# Patient Record
Sex: FEMALE | Race: WHITE | HISPANIC OR LATINO | URBAN - METROPOLITAN AREA
[De-identification: names, ages, dates, MRNs, and addresses within clinical notes are randomized per-mention and may not be internally consistent; named-entity substitution may affect disease eponyms.]

---

## 2022-09-01 ENCOUNTER — OFFICE VISIT (OUTPATIENT)
Dept: URBAN - METROPOLITAN AREA CLINIC 7 | Facility: CLINIC | Age: 32
End: 2022-09-01
Payer: COMMERCIAL

## 2022-09-01 VITALS
HEIGHT: 62 IN | SYSTOLIC BLOOD PRESSURE: 112 MMHG | WEIGHT: 133 LBS | DIASTOLIC BLOOD PRESSURE: 75 MMHG | BODY MASS INDEX: 24.48 KG/M2 | TEMPERATURE: 98 F

## 2022-09-01 DIAGNOSIS — R10.13 EPIGASTRIC PAIN: ICD-10-CM

## 2022-09-01 DIAGNOSIS — K21.9 GASTROESOPHAGEAL REFLUX DISEASE, UNSPECIFIED WHETHER ESOPHAGITIS PRESENT: ICD-10-CM

## 2022-09-01 DIAGNOSIS — K59.00 CONSTIPATION, UNSPECIFIED CONSTIPATION TYPE: ICD-10-CM

## 2022-09-01 PROBLEM — 79922009: Status: ACTIVE | Noted: 2022-09-01

## 2022-09-01 PROBLEM — 14760008: Status: ACTIVE | Noted: 2022-09-01

## 2022-09-01 PROCEDURE — 99244 OFF/OP CNSLTJ NEW/EST MOD 40: CPT | Performed by: STUDENT IN AN ORGANIZED HEALTH CARE EDUCATION/TRAINING PROGRAM

## 2022-09-01 RX ORDER — FAMOTIDINE 40 MG/1
1 TABLET AT BEDTIME TABLET, FILM COATED ORAL ONCE A DAY
Status: ACTIVE | COMMUNITY

## 2022-09-01 RX ORDER — PANTOPRAZOLE SODIUM 40 MG/1
1 TABLET TABLET, DELAYED RELEASE ORAL TWICE A DAY
Status: ACTIVE | COMMUNITY

## 2022-09-01 RX ORDER — PSYLLIUM SEED (WITH DEXTROSE)
1 PACKET WITH 8 OUNCES OF LIQUID AS NEEDED POWDER (GRAM) ORAL TWICE A DAY
Qty: 60 | Refills: 0 | OUTPATIENT
Start: 2022-09-01 | End: 2022-10-01

## 2022-09-01 RX ORDER — ESCITALOPRAM OXALATE 10 MG/1
1 TABLET TABLET ORAL ONCE A DAY
Status: ACTIVE | COMMUNITY

## 2022-09-01 NOTE — HPI-TODAY'S VISIT:
Patient has a history of GERD on pantoprazole and famotidine who presents for eval of EGD  GI Hx: 8/2021- 4/2022 was pregnant and had daily nausea, vomiting (bilious), epigastric pain constant, but increased at some times. Currently symptoms are constant. Worse with eating spicy foods. Not breastfeeding. Having constipation. BM is pasty, foul smelling. Eating dietary fiber. Drinking 6 water botles. 1 cup of coffee/day.  Denies weight loss, fever, chills, abdominal pain, nausea, vomiting, diarrhea.  Denies dysphagia. Denies hematemesis, melena, hematochezia, blood per rectum.  EGD: None  Colonoscopy: None  Imaging/Studies/Procedures:

## 2022-09-09 PROBLEM — 235595009: Status: ACTIVE | Noted: 2022-08-31

## 2022-09-10 LAB
% SATURATION: 15
A/G RATIO: 1.8
ABSOLUTE BASOPHILS: 19
ABSOLUTE EOSINOPHILS: 139
ABSOLUTE LYMPHOCYTES: 1666
ABSOLUTE MONOCYTES: 355
ABSOLUTE NEUTROPHILS: 2621
ALBUMIN: 4.7
ALKALINE PHOSPHATASE: 74
ALT (SGPT): 14
AST (SGOT): 14
BASOPHILS: 0.4
BILIRUBIN, TOTAL: 0.4
BUN/CREATININE RATIO: (no result)
BUN: 15
CALCIUM: 9.4
CARBON DIOXIDE, TOTAL: 24
CHLORIDE: 104
CREATININE: 0.63
EGFR: 119
EOSINOPHILS: 2.9
FERRITIN: 9
GLOBULIN, TOTAL: 2.6
GLUCOSE: 80
HEMATOCRIT: 38.9
HEMOGLOBIN: 13.1
IMMUNOGLOBULIN A, QN, SERUM: 145
INTERPRETATION: (no result)
IRON BINDING CAPACITY: 387
IRON, TOTAL: 57
LIPASE: 41
LYMPHOCYTES: 34.7
MCH: 30
MCHC: 33.7
MCV: 89
MONOCYTES: 7.4
MPV: 10.7
NEUTROPHILS: 54.6
PLATELET COUNT: 270
POTASSIUM: 4.3
PROTEIN, TOTAL: 7.3
RDW: 13.4
RED BLOOD CELL COUNT: 4.37
SODIUM: 137
T-TRANSGLUTAMINASE (TTG) IGA: <1
TSH W/REFLEX TO FT4: 0.93
WHITE BLOOD CELL COUNT: 4.8

## 2022-09-16 ENCOUNTER — OFFICE VISIT (OUTPATIENT)
Dept: URBAN - METROPOLITAN AREA SURGERY CENTER 5 | Facility: SURGERY CENTER | Age: 32
End: 2022-09-16
Payer: COMMERCIAL

## 2022-09-16 DIAGNOSIS — K29.70 CHRONIC ACITVE GASTRITIS (H.PYLORI NEGATIVE): ICD-10-CM

## 2022-09-16 PROCEDURE — 43239 EGD BIOPSY SINGLE/MULTIPLE: CPT | Performed by: STUDENT IN AN ORGANIZED HEALTH CARE EDUCATION/TRAINING PROGRAM

## 2022-09-16 RX ORDER — PSYLLIUM SEED (WITH DEXTROSE)
1 PACKET WITH 8 OUNCES OF LIQUID AS NEEDED POWDER (GRAM) ORAL TWICE A DAY
Qty: 60 | Refills: 0 | Status: ACTIVE | COMMUNITY
Start: 2022-09-01 | End: 2022-10-01

## 2022-09-16 RX ORDER — PANTOPRAZOLE SODIUM 40 MG/1
1 TABLET TABLET, DELAYED RELEASE ORAL TWICE A DAY
Status: ACTIVE | COMMUNITY

## 2022-09-16 RX ORDER — ESCITALOPRAM OXALATE 10 MG/1
1 TABLET TABLET ORAL ONCE A DAY
Status: ACTIVE | COMMUNITY

## 2022-09-16 RX ORDER — FAMOTIDINE 40 MG/1
1 TABLET AT BEDTIME TABLET, FILM COATED ORAL ONCE A DAY
Status: ACTIVE | COMMUNITY

## 2022-09-16 NOTE — HPI-TODAY'S VISIT:
Patient has a history of GERD on pantoprazole and famotidine who presents for eval of EGD  GI Hx: 8/2021- 4/2022 was pregnant and had daily nausea, vomiting (bilious), epigastric pain constant, but increased at some times. Currently symptoms are constant. Worse with eating spicy foods. Not breastfeeding. Having constipation. BM is pasty, foul smelling. Eating dietary fiber. Drinking 6 water botles. 1 cup of coffee/day.  Denies weight loss, fever, chills, abdominal pain, nausea, vomiting, diarrhea.  Denies dysphagia. Denies hematemesis, melena, hematochezia, blood per rectum.  EGD: None  Colonoscopy: None  Imaging/Studies/Procedures: RUQ U/S 9/12/2022- normal. Labs 8/2022- normal CMP, CBC, TSH, Ferritin low, but no MICHI

## 2022-09-26 ENCOUNTER — TELEPHONE ENCOUNTER (OUTPATIENT)
Dept: URBAN - METROPOLITAN AREA CLINIC 7 | Facility: CLINIC | Age: 32
End: 2022-09-26

## 2022-09-26 PROBLEM — 35240004: Status: ACTIVE | Noted: 2022-09-26

## 2022-09-26 PROBLEM — 87522002: Status: ACTIVE | Noted: 2022-09-26

## 2022-10-18 ENCOUNTER — TELEPHONE ENCOUNTER (OUTPATIENT)
Dept: URBAN - METROPOLITAN AREA CLINIC 7 | Facility: CLINIC | Age: 32
End: 2022-10-18

## 2022-11-15 ENCOUNTER — TELEPHONE ENCOUNTER (OUTPATIENT)
Dept: URBAN - METROPOLITAN AREA CLINIC 9 | Facility: CLINIC | Age: 32
End: 2022-11-15

## 2022-11-15 RX ORDER — PANTOPRAZOLE SODIUM 40 MG/1
1 TABLET TABLET, DELAYED RELEASE ORAL TWICE A DAY
Status: ACTIVE | COMMUNITY

## 2022-11-15 RX ORDER — FAMOTIDINE 40 MG/1
1 TABLET AT BEDTIME TABLET, FILM COATED ORAL ONCE A DAY
Status: ACTIVE | COMMUNITY

## 2022-11-15 RX ORDER — ESCITALOPRAM OXALATE 10 MG/1
1 TABLET TABLET ORAL ONCE A DAY
Status: ACTIVE | COMMUNITY

## 2022-11-15 RX ORDER — OMEPRAZOLE 20 MG/1
1 CAPSULE 30 MINUTES BEFORE A MEAL CAPSULE, DELAYED RELEASE ORAL TWICE A DAY
Qty: 180 | Refills: 3 | OUTPATIENT

## 2023-05-30 ENCOUNTER — TELEPHONE ENCOUNTER (OUTPATIENT)
Dept: URBAN - METROPOLITAN AREA CLINIC 7 | Facility: CLINIC | Age: 33
End: 2023-05-30

## 2023-06-01 ENCOUNTER — TELEPHONE ENCOUNTER (OUTPATIENT)
Dept: URBAN - METROPOLITAN AREA CLINIC 7 | Facility: CLINIC | Age: 33
End: 2023-06-01

## 2023-06-02 ENCOUNTER — WEB ENCOUNTER (OUTPATIENT)
Dept: URBAN - METROPOLITAN AREA CLINIC 7 | Facility: CLINIC | Age: 33
End: 2023-06-02

## 2023-06-02 ENCOUNTER — DASHBOARD ENCOUNTERS (OUTPATIENT)
Age: 33
End: 2023-06-02

## 2023-06-02 ENCOUNTER — OFFICE VISIT (OUTPATIENT)
Dept: URBAN - METROPOLITAN AREA CLINIC 7 | Facility: CLINIC | Age: 33
End: 2023-06-02
Payer: COMMERCIAL

## 2023-06-02 ENCOUNTER — TELEPHONE ENCOUNTER (OUTPATIENT)
Dept: URBAN - METROPOLITAN AREA CLINIC 7 | Facility: CLINIC | Age: 33
End: 2023-06-02

## 2023-06-02 VITALS
HEIGHT: 62 IN | BODY MASS INDEX: 27.23 KG/M2 | SYSTOLIC BLOOD PRESSURE: 120 MMHG | DIASTOLIC BLOOD PRESSURE: 82 MMHG | WEIGHT: 148 LBS | RESPIRATION RATE: 16 BRPM | TEMPERATURE: 97.7 F

## 2023-06-02 DIAGNOSIS — R10.13 EPIGASTRIC PAIN: ICD-10-CM

## 2023-06-02 DIAGNOSIS — K21.9 GASTROESOPHAGEAL REFLUX DISEASE, UNSPECIFIED WHETHER ESOPHAGITIS PRESENT: ICD-10-CM

## 2023-06-02 DIAGNOSIS — R14.0 BLOATING: ICD-10-CM

## 2023-06-02 DIAGNOSIS — R11.0 NAUSEA: ICD-10-CM

## 2023-06-02 DIAGNOSIS — K59.00 CONSTIPATION, UNSPECIFIED CONSTIPATION TYPE: ICD-10-CM

## 2023-06-02 DIAGNOSIS — D50.9 IRON DEFICIENCY ANEMIA, UNSPECIFIED IRON DEFICIENCY ANEMIA TYPE: ICD-10-CM

## 2023-06-02 PROCEDURE — 99214 OFFICE O/P EST MOD 30 MIN: CPT | Performed by: STUDENT IN AN ORGANIZED HEALTH CARE EDUCATION/TRAINING PROGRAM

## 2023-06-02 RX ORDER — RABEPRAZOLE SODIUM 20 MG/1
1 TABLET TABLET, DELAYED RELEASE ORAL TWICE A DAY
Qty: 60 TABLET | Refills: 5 | OUTPATIENT
Start: 2023-06-02

## 2023-06-02 RX ORDER — ESCITALOPRAM OXALATE 10 MG/1
1 TABLET TABLET ORAL ONCE A DAY
Status: ACTIVE | COMMUNITY

## 2023-06-02 RX ORDER — PANTOPRAZOLE SODIUM 40 MG/1
1 TABLET TABLET, DELAYED RELEASE ORAL TWICE A DAY
Status: DISCONTINUED | COMMUNITY

## 2023-06-02 RX ORDER — OMEPRAZOLE 20 MG/1
1 CAPSULE 30 MINUTES BEFORE A MEAL CAPSULE, DELAYED RELEASE ORAL TWICE A DAY
OUTPATIENT

## 2023-06-02 RX ORDER — FAMOTIDINE 40 MG/1
1 TABLET AT BEDTIME TABLET, FILM COATED ORAL ONCE A DAY
Status: ACTIVE | COMMUNITY

## 2023-06-02 RX ORDER — FAMOTIDINE 40 MG/1
1 TABLET AT BEDTIME TABLET, FILM COATED ORAL ONCE A DAY
Qty: 30 | Refills: 3 | OUTPATIENT

## 2023-06-02 RX ORDER — OMEPRAZOLE 20 MG/1
1 CAPSULE 30 MINUTES BEFORE A MEAL CAPSULE, DELAYED RELEASE ORAL TWICE A DAY
Qty: 180 | Refills: 3 | Status: ACTIVE | COMMUNITY

## 2023-06-02 NOTE — HPI-TODAY'S VISIT:
Patient has a history of GERD, who presents for follow up  GI Hx: 8/2021- 4/2022 was pregnant and had daily nausea, vomiting (bilious), epigastric pain constant, but increased at some times. Currently symptoms are constant. Worse with eating spicy foods. Not breastfeeding. Having constipation. BM is pasty, foul smelling. Eating dietary fiber. Drinking 6 water botles. 1 cup of coffee/day. 6/2/23- Still having abd pain, nausea, GERD. Took her mother's dexilant and it helped. Having some nasuea and early satiety.  Denies weight loss, fever, chills, abdominal pain, nausea, vomiting, diarrhea.  Denies dysphagia. Denies hematemesis, melena, hematochezia, blood per rectum.  EGD: 9/16/22- gastritis (bx: mild chronic inflammation, neg HP), normal duodenum (bx: normal).  Colonoscopy: None  Imaging/Studies/Procedures: 4/2022- Hgb 8.1. RUQ U/S 9/12/2022- normal. Labs 8/2022- normal CMP, CBC, TSH, Hgb 13.1, Fe studies: 7/15%, Ferritin 9.

## 2023-06-05 ENCOUNTER — TELEPHONE ENCOUNTER (OUTPATIENT)
Dept: URBAN - METROPOLITAN AREA CLINIC 7 | Facility: CLINIC | Age: 33
End: 2023-06-05

## 2023-06-19 ENCOUNTER — OFFICE VISIT (OUTPATIENT)
Dept: URBAN - METROPOLITAN AREA CLINIC 7 | Facility: CLINIC | Age: 33
End: 2023-06-19

## 2023-06-19 RX ORDER — ESCITALOPRAM OXALATE 10 MG/1
1 TABLET TABLET ORAL ONCE A DAY
Status: ACTIVE | COMMUNITY

## 2023-06-19 RX ORDER — RABEPRAZOLE SODIUM 20 MG/1
1 TABLET TABLET, DELAYED RELEASE ORAL TWICE A DAY
Qty: 60 TABLET | Refills: 5 | Status: ACTIVE | COMMUNITY
Start: 2023-06-02

## 2023-06-19 RX ORDER — FAMOTIDINE 40 MG/1
1 TABLET AT BEDTIME TABLET, FILM COATED ORAL ONCE A DAY
Qty: 30 | Refills: 3 | Status: ACTIVE | COMMUNITY

## 2023-06-19 RX ORDER — FAMOTIDINE 40 MG/1
1 TABLET AT BEDTIME TABLET, FILM COATED ORAL ONCE A DAY
Qty: 30 | Refills: 3 | OUTPATIENT

## 2023-06-19 RX ORDER — RABEPRAZOLE SODIUM 20 MG/1
1 TABLET TABLET, DELAYED RELEASE ORAL TWICE A DAY
Qty: 60 TABLET | Refills: 5 | OUTPATIENT

## 2023-06-19 NOTE — HPI-TODAY'S VISIT:
Patient has a history of GERD, who presents for follow up  GI Hx: 8/2021- 4/2022 was pregnant and had daily nausea, vomiting (bilious), epigastric pain constant, but increased at some times. Currently symptoms are constant. Worse with eating spicy foods. Not breastfeeding. Having constipation. BM is pasty, foul smelling. Eating dietary fiber. Drinking 6 water botles. 1 cup of coffee/day. 6/2/23- Still having abd pain, nausea, GERD. Took her mother's dexilant and it helped. Having some nasuea and early satiety. 6/19/23- *** Labs?  Denies weight loss, fever, chills, abdominal pain, nausea, vomiting, diarrhea.  Denies dysphagia. Denies hematemesis, melena, hematochezia, blood per rectum.  EGD: 9/16/22- gastritis (bx: mild chronic inflammation, neg HP), normal duodenum (bx: normal).  Colonoscopy: None  Imaging/Studies/Procedures: 4/2022- Hgb 8.1. Labs 8/2022- normal CMP, CBC, TSH, Hgb 13.1, Fe studies: 7/15%, Ferritin 9. RUQ U/S 9/12/2022- normal.

## 2023-06-19 NOTE — PHYSICAL EXAM NECK/THYROID:
Normal appearance, trachea midline Gen: No acute distress, non toxic  HEENT: Mucous membranes moist, pink conjunctivae, EOMI  CV: RRR, nl s1/s2.  Resp: CTAB, normal rate and effort  GI: Abdomen soft, NT, ND. No rebound, no guarding  : No CVAT  Neuro: A&O x 3, moving all 4 extremities  MSK: No midline spine tenderness to palpation. +Right paraspinal tenderness. 5/5 strength BUE/BLE, sensation intact throughout, 2+ distal pulses  Skin: No rashes. intact and perfused.

## 2023-06-20 ENCOUNTER — TELEPHONE ENCOUNTER (OUTPATIENT)
Dept: URBAN - METROPOLITAN AREA CLINIC 7 | Facility: CLINIC | Age: 33
End: 2023-06-20

## 2024-07-03 ENCOUNTER — TELEPHONE ENCOUNTER (OUTPATIENT)
Dept: URBAN - METROPOLITAN AREA CLINIC 7 | Facility: CLINIC | Age: 34
End: 2024-07-03